# Patient Record
Sex: FEMALE | Race: BLACK OR AFRICAN AMERICAN | NOT HISPANIC OR LATINO | Employment: OTHER | URBAN - METROPOLITAN AREA
[De-identification: names, ages, dates, MRNs, and addresses within clinical notes are randomized per-mention and may not be internally consistent; named-entity substitution may affect disease eponyms.]

---

## 2017-10-16 PROBLEM — M19.011 GLENOHUMERAL ARTHRITIS, RIGHT: Status: ACTIVE | Noted: 2017-10-16

## 2017-11-30 PROBLEM — D64.9 ANEMIA: Status: ACTIVE | Noted: 2017-11-30

## 2017-11-30 PROBLEM — N18.30 CKD (CHRONIC KIDNEY DISEASE) STAGE 3, GFR 30-59 ML/MIN: Status: ACTIVE | Noted: 2017-11-30

## 2017-12-10 PROBLEM — E87.1 HYPONATREMIA: Status: ACTIVE | Noted: 2017-12-10

## 2017-12-10 PROBLEM — E16.2 HYPOGLYCEMIA: Status: ACTIVE | Noted: 2017-12-10

## 2017-12-11 PROBLEM — E87.5 HYPERKALEMIA: Status: ACTIVE | Noted: 2017-12-11

## 2018-01-16 PROBLEM — Z96.611 STATUS POST REVERSE TOTAL REPLACEMENT OF RIGHT SHOULDER: Status: ACTIVE | Noted: 2018-01-16

## 2018-06-05 PROBLEM — M47.816 ARTHRITIS OF LUMBAR SPINE: Status: ACTIVE | Noted: 2018-06-05

## 2018-07-29 PROBLEM — R00.1 BRADYCARDIA: Status: ACTIVE | Noted: 2018-07-29

## 2018-07-29 PROBLEM — N17.9 AKI (ACUTE KIDNEY INJURY): Status: ACTIVE | Noted: 2018-07-29

## 2018-07-30 ENCOUNTER — TELEPHONE (OUTPATIENT)
Dept: NEPHROLOGY | Facility: CLINIC | Age: 83
End: 2018-07-30

## 2018-07-30 NOTE — TELEPHONE ENCOUNTER
As LAURA was calling consult, but patient reported after consult was placed  that she has a nephrologist.  I asked the staff member to remove the patient from the hospital call list.

## 2018-07-31 PROBLEM — E87.1 HYPONATREMIA: Status: RESOLVED | Noted: 2017-12-10 | Resolved: 2018-07-31

## 2018-07-31 PROBLEM — R19.7 DIARRHEA: Status: ACTIVE | Noted: 2018-07-31

## 2020-05-04 PROBLEM — N18.4 CKD (CHRONIC KIDNEY DISEASE) STAGE 4, GFR 15-29 ML/MIN: Status: ACTIVE | Noted: 2020-05-04

## 2020-06-08 PROBLEM — M46.96 UNSPECIFIED INFLAMMATORY SPONDYLOPATHY, LUMBAR REGION: Status: ACTIVE | Noted: 2020-06-08

## 2020-10-04 PROBLEM — R11.2 NAUSEA WITH VOMITING: Status: ACTIVE | Noted: 2020-10-04

## 2020-10-04 PROBLEM — E87.1 HYPONATREMIA: Status: ACTIVE | Noted: 2020-10-04

## 2020-10-04 PROBLEM — Z71.89 ADVANCE CARE PLANNING: Status: ACTIVE | Noted: 2020-10-04

## 2020-10-07 PROBLEM — Z75.8 DISCHARGE PLANNING ISSUES: Status: ACTIVE | Noted: 2020-10-07

## 2020-10-16 PROBLEM — R00.1 SYMPTOMATIC BRADYCARDIA: Status: ACTIVE | Noted: 2020-10-16

## 2020-10-18 PROBLEM — E04.2 MULTIPLE THYROID NODULES: Status: ACTIVE | Noted: 2020-10-18

## 2020-10-22 PROBLEM — E04.1 THYROID NODULE: Status: ACTIVE | Noted: 2020-10-22

## 2021-05-08 PROBLEM — R00.1 JUNCTIONAL BRADYCARDIA: Status: ACTIVE | Noted: 2021-05-08

## 2021-05-08 PROBLEM — J81.0 ACUTE PULMONARY EDEMA: Status: ACTIVE | Noted: 2021-05-08

## 2021-05-08 PROBLEM — N18.4 ACUTE RENAL FAILURE SUPERIMPOSED ON STAGE 4 CHRONIC KIDNEY DISEASE: Status: ACTIVE | Noted: 2018-07-29

## 2021-05-08 PROBLEM — I50.33 ACUTE ON CHRONIC DIASTOLIC HEART FAILURE: Status: ACTIVE | Noted: 2021-05-08

## 2021-05-08 PROBLEM — R00.1 SYMPTOMATIC BRADYCARDIA: Status: RESOLVED | Noted: 2020-10-16 | Resolved: 2021-05-08

## 2021-05-09 PROBLEM — D63.1 ANEMIA DUE TO STAGE 4 CHRONIC KIDNEY DISEASE: Status: ACTIVE | Noted: 2017-11-30

## 2021-05-09 PROBLEM — N18.4 ANEMIA DUE TO STAGE 4 CHRONIC KIDNEY DISEASE: Status: ACTIVE | Noted: 2017-11-30

## 2021-05-10 PROBLEM — R53.81 PHYSICAL DEBILITY: Status: ACTIVE | Noted: 2021-05-10

## 2021-05-25 PROBLEM — Z95.0 STATUS POST PLACEMENT OF CARDIAC PACEMAKER: Status: ACTIVE | Noted: 2021-05-25

## 2021-08-09 PROBLEM — J81.0 ACUTE PULMONARY EDEMA: Status: RESOLVED | Noted: 2021-05-08 | Resolved: 2021-08-09

## 2022-03-18 ENCOUNTER — IMMUNIZATION (OUTPATIENT)
Dept: PHARMACY | Facility: CLINIC | Age: 87
End: 2022-03-18
Payer: MEDICAID

## 2022-03-18 DIAGNOSIS — Z23 NEED FOR VACCINATION: Primary | ICD-10-CM

## 2022-09-01 PROBLEM — F03.90 DEMENTIA WITHOUT BEHAVIORAL DISTURBANCE, UNSPECIFIED DEMENTIA TYPE: Status: ACTIVE | Noted: 2022-09-01

## 2022-09-01 PROBLEM — E21.3 HYPERPARATHYROIDISM: Status: ACTIVE | Noted: 2022-09-01

## 2022-12-26 PROBLEM — K57.92 DIVERTICULITIS: Status: ACTIVE | Noted: 2022-12-26

## 2022-12-29 PROBLEM — U07.1 COVID: Status: ACTIVE | Noted: 2022-12-29

## 2022-12-29 PROBLEM — I50.43 ACUTE ON CHRONIC COMBINED SYSTOLIC AND DIASTOLIC HEART FAILURE: Status: ACTIVE | Noted: 2021-05-08

## 2022-12-30 PROBLEM — R53.1 WEAKNESS: Status: RESOLVED | Noted: 2021-05-10 | Resolved: 2022-12-30

## 2022-12-30 PROBLEM — R53.1 WEAKNESS: Status: ACTIVE | Noted: 2021-05-10

## 2022-12-30 PROBLEM — I50.32 CHRONIC DIASTOLIC HEART FAILURE: Status: ACTIVE | Noted: 2021-05-08

## 2023-01-13 PROBLEM — I50.9 ACUTE EXACERBATION OF CHF (CONGESTIVE HEART FAILURE): Status: ACTIVE | Noted: 2023-01-13

## 2023-01-14 PROBLEM — I48.19 PERSISTENT ATRIAL FIBRILLATION: Status: ACTIVE | Noted: 2023-01-14

## 2023-01-16 PROBLEM — M75.52 ACUTE BURSITIS OF LEFT SHOULDER: Status: ACTIVE | Noted: 2023-01-16

## 2023-01-16 PROBLEM — R53.81 PHYSICAL DECONDITIONING: Status: ACTIVE | Noted: 2023-01-16

## 2023-03-09 PROBLEM — Z99.89 WALKER AS AMBULATION AID: Status: ACTIVE | Noted: 2023-03-09

## 2023-03-09 PROBLEM — E78.5 DYSLIPIDEMIA ASSOCIATED WITH TYPE 2 DIABETES MELLITUS: Status: ACTIVE | Noted: 2023-03-09

## 2023-03-09 PROBLEM — R54 FRAIL ELDERLY: Status: ACTIVE | Noted: 2023-03-09

## 2023-03-09 PROBLEM — E11.69 DYSLIPIDEMIA ASSOCIATED WITH TYPE 2 DIABETES MELLITUS: Status: ACTIVE | Noted: 2023-03-09

## 2023-04-03 PROBLEM — N18.4 ACUTE RENAL FAILURE SUPERIMPOSED ON STAGE 4 CHRONIC KIDNEY DISEASE: Status: RESOLVED | Noted: 2018-07-29 | Resolved: 2023-04-03

## 2023-04-03 PROBLEM — N17.9 ACUTE RENAL FAILURE SUPERIMPOSED ON STAGE 4 CHRONIC KIDNEY DISEASE: Status: RESOLVED | Noted: 2018-07-29 | Resolved: 2023-04-03

## 2023-05-20 PROBLEM — E11.69 DYSLIPIDEMIA ASSOCIATED WITH TYPE 2 DIABETES MELLITUS: Status: RESOLVED | Noted: 2023-03-09 | Resolved: 2023-05-20

## 2023-05-20 PROBLEM — E78.5 DYSLIPIDEMIA ASSOCIATED WITH TYPE 2 DIABETES MELLITUS: Status: RESOLVED | Noted: 2023-03-09 | Resolved: 2023-05-20

## 2023-05-30 PROBLEM — N18.32 STAGE 3B CHRONIC KIDNEY DISEASE: Status: ACTIVE | Noted: 2020-05-04

## 2023-05-30 PROBLEM — E87.1 HYPONATREMIA: Status: RESOLVED | Noted: 2020-10-04 | Resolved: 2023-05-30

## 2023-05-30 PROBLEM — E87.5 HYPERKALEMIA: Status: RESOLVED | Noted: 2017-12-11 | Resolved: 2023-05-30

## 2023-05-30 PROBLEM — R00.1 BRADYCARDIA: Status: RESOLVED | Noted: 2018-07-29 | Resolved: 2023-05-30

## 2023-05-30 PROBLEM — D63.1 ANEMIA DUE TO STAGE 4 CHRONIC KIDNEY DISEASE: Status: RESOLVED | Noted: 2017-11-30 | Resolved: 2023-05-30

## 2023-05-30 PROBLEM — N18.4 ANEMIA DUE TO STAGE 4 CHRONIC KIDNEY DISEASE: Status: RESOLVED | Noted: 2017-11-30 | Resolved: 2023-05-30

## 2023-06-13 ENCOUNTER — OUTPATIENT CASE MANAGEMENT (OUTPATIENT)
Dept: ADMINISTRATIVE | Facility: OTHER | Age: 88
End: 2023-06-13
Payer: MEDICARE

## 2023-06-13 NOTE — PROGRESS NOTES
"Outpatient Care Management  Initial Patient Assessment    Patient: Kezia Gentile  MRN: [id number]  Date of Service: 06/13/2023  Completed by: Destiny Avalos RN  Referral Date: 05/21/2023  Program: High Risk  Status: Ongoing  Effective Dates: 6/13/2023 - present  Responsible Staff: Destiny Avalos RN    Reason for Visit   Patient presents with    OPC Enrollment Call     6/13/23    Nursing Assessment     6/13/23     Brief Summary:  Kezia Gentile was referred by  at [facility] for CHF. Patient qualifies for program based on high risk score of 73.1%. Active problem list, medical, surgical and social history reviewed. Active comorbidities include PPM, CKD4, HTN, HLD, A FIB, DM2, depression and neuropathy. Areas of need identified by patient include CHF.     During the call with , she expressed concerns with being able to afford her utility bills.     Next steps:   Refer to [facility] SW for financial assistance resources with utility bills.  Send in basket message to Dr. Trujillo informing him that  has been enrolled in the OPCM program.  Send in basket message to [facility] to place their services on hold during OPC enrollment.   Send Mrs. Gentile educational materials "Home Care Guide for Heart Failure Patients" and "Heart Healthy Eating Nutrition Therapy" via [employer] for review.     Disability Status  Hearing Difficulty or Deaf: yes  Hearing Management: other (speak loudly)  Visual Difficulty or Blind: yes  Vision Management: Other (glasses)  Difficulty Concentrating, Remembering or Making Decisions: no  Communication Difficulty: no  Eating/Swallowing Difficulty: no  Walking or Climbing Stairs Difficulty: yes  Dressing/Bathing Difficulty: yes  Toileting : Independent  Difficulty Managing Errands Independently: yes  Equipment Currently Used at Home: walker, rolling; bath bench; shower chair; grab bar  Change in Functional Status Since Onset of Current Illness/Injury: no    Spiritual " "Beliefs  Spiritual, Cultural Beliefs, Oriental orthodox Practices, Values that Affect Care: no    Social History     Socioeconomic History    Marital status:    Tobacco Use    Smoking status: Never    Smokeless tobacco: Never   Substance and Sexual Activity    Alcohol use: Yes     Comment: "nightcap before bed"    Drug use: Never    Sexual activity: Not Currently     Social Determinants of Health     Financial Resource Strain: High Risk    Difficulty of Paying Living Expenses: Very hard   Food Insecurity: Food Insecurity Present    Worried About Running Out of Food in the Last Year: Sometimes true    Ran Out of Food in the Last Year: Sometimes true   Transportation Needs: No Transportation Needs    Lack of Transportation (Medical): No    Lack of Transportation (Non-Medical): No   Physical Activity: Insufficiently Active    Days of Exercise per Week: 1 day    Minutes of Exercise per Session: 20 min   Stress: Stress Concern Present    Feeling of Stress : To some extent   Social Connections: Moderately Isolated    Frequency of Communication with Friends and Family: More than three times a week    Frequency of Social Gatherings with Friends and Family: More than three times a week    Attends Oriental orthodox Services: 1 to 4 times per year    Active Member of Clubs or Organizations: No    Attends Club or Organization Meetings: Never    Marital Status:    Housing Stability: Low Risk     Unable to Pay for Housing in the Last Year: No    Number of Places Lived in the Last Year: 1    Unstable Housing in the Last Year: No     Roles and Relationships  Primary Source of Support/Comfort: nonrelative caregiver; child(thomas)  Name of Support/Comfort Primary Source: Joya Mckeon Caregiver  Primary Roles/Responsibilities: retired    Advance Directives (For Healthcare)  Advance Directive  (If Adv Dir status is received, view document under Adv Dir in header or Chart Review Media tab): Advance Directive currently in Epic.    Patient " Reported Insurance  Verified current insurance plan:: [employer]vantage; Medicaid  [employer] benefits discussed:: OTC Prescription Discounts; Well [employer]; Mail Order Pharmacy    Depression Patient Health Questionnaire 6/13/2023 3/9/2023 2/9/2022 2/5/2019 1/23/2018 1/3/2017 9/6/2016   Over the last two weeks how often have you been bothered by little interest or pleasure in doing things Not at all Not at all Several days Not at all Not at all Not at all Not at all   Over the last two weeks how often have you been bothered by feeling down, depressed or hopeless Not at all Not at all Several days Not at all Not at all Not at all Not at all   PHQ-2 Total Score 0 0 2 0 0 0 0   Over the last two weeks how often have you been bothered by trouble falling or staying asleep, or sleeping too much - - Nearly every day - - - -   Over the last two weeks how often have you been bothered by feeling tired or having little energy - - More than half the days - - - -   Over the last two weeks how often have you been bothered by a poor appetite or overeating - - Several days - - - -   Over the last two weeks how often have you been bothered by feeling bad about yourself - or that you are a failure or have let yourself or your family down - - Several days - - - -   Over the last two weeks how often have you been bothered by trouble concentrating on things, such as reading the newspaper or watching television - - More than half the days - - - -   Over the last two weeks how often have you been bothered by moving or speaking so slowly that other people could have noticed. Or the opposite - being so fidgety or restless that you have been moving around a lot more than usual. - - Several days - - - -   Over the last two weeks how often have you been bothered by thoughts that you would be better off dead, or of hurting yourself - - Not at all - - - -   If you checked off any problems, how difficult have these problems made it for you to do  your work, take care of things at home or get along with other people? - - Somewhat difficult - - - -   Total Score - - 12 - - - -   Interpretation - - Moderate - - - -     Learning Assessment       06/13/2023 1615 [facility] (6/13/2023 - Present)   Created by ELIOT Lassiter (Nurse) Status: Complete                 PRIMARY LEARNER     Primary Learner Name:  Kezia Gentile CD - 06/13/2023 1615    Relationship:  Patient CD - 06/13/2023 1615    Does the primary learner have any barriers to learning?:  Visual CD - 06/13/2023 1615    What is the preferred language of the primary learner?:  English CD - 06/13/2023 1615    Is an  required?:  No CD - 06/13/2023 1615    How does the primary learner prefer to learn new concepts?:  Reading, Listening, Demonstration, Pictures/Video CD - 06/13/2023 1615    How often do you need to have someone help you read instructions, pamphlets, or written material from your doctor or pharmacy?:  Sometimes CD - 06/13/2023 1615        CO-LEARNER #1     Co-Learner Name (if applicable):  Joya Mckeon CD - 06/13/2023 1615    Relationship:  Co-learner CD - 06/13/2023 1615    Does the co-learner have any barriers to learning?:  No Barriers CD - 06/13/2023 1615    What is the preferred language of the co-learner?:  English CD - 06/13/2023 1615    Is an  required?:  No  - 06/13/2023 1615    How does the co-learner prefer to learn new concepts?:  Listening, Reading, Demonstration, Pictures/Video CD - 06/13/2023 1615        CO-LEARNER #2     No question answered        SPECIAL TOPICS     No question answered        ANSWERED BY:     No question answered        Edit History       ELIOT Lassiter (Nurse)   06/13/2023 1615

## 2023-06-13 NOTE — LETTER
Kezia Gentile  [id number] [address] [facility] [address]    Dear ,     Welcome to [facility]s Outpatient Care Management Program. We are here to assist patients with multiple long-term (chronic) conditions who often need more personalized healthcare.    It was a pleasure talking with you today. My name is Destiny Avalos RN. The [facility] Outpatient Case Management team  looks forward to working with you as your care management provider. A  will be contacting you by telephone routinely to help coordinate care and resolve issues.    Our goal is to help you function at the healthiest and highest level possible. You can contact the department directly at [phone].    As an [facility] patient with [employer], some of the services we provide, at no cost to you, include:      Development of an individualized care plan with a Registered Nurse    Connection with a    Assistance from a Community Health Worker   Connection with available resources and services     Coordinate communication among your care team members    Provide coaching and education    Help you understand your doctors treatment plan   Help you obtain information about your insurance coverage.     All services provided by [facility]s Outpatient Care Managers and other care team members are coordinated with and communicated to your primary care team.      As part of your enrollment, you will be receiving education materials and more information about these services in your [facility] account, by phone, or through the mail. If you do not wish to participate or receive information, you can Opt Out by contacting our office at [phone].      Sincerely,        Destiny Avalos RN  [facility]   Outpatient Care Management

## 2023-06-13 NOTE — PATIENT INSTRUCTIONS
Home Care Guide for Heart Failure Patients and Heart Healthy Eating Nutrtion Therapy printed education materials mailed to patient for review.

## 2023-06-19 ENCOUNTER — OUTPATIENT CASE MANAGEMENT (OUTPATIENT)
Dept: ADMINISTRATIVE | Facility: OTHER | Age: 88
End: 2023-06-19
Payer: MEDICARE

## 2023-06-19 NOTE — PROGRESS NOTES
Outpatient Care Management   - High Risk Patient Assessment    Patient: Kezia Gentile  MRN:  [id number]  Date of Service:  6/19/2023  Completed by:  Rubia Moura LMSW  Referral Date: 05/21/2023    Reason for Visit   Patient presents with    Social Work Assessment - High Risk     6/19/2023       Brief Summary:  [facility] Destiny Avalos for the following patient identified psycho-social needs : utility assistance. SW completed social assessment with pt via telephone. Pt lives alone, but her son Marin and daughter in law live next door and help out often. Pt utilizes a walker, rollator and shower chair. Pt's requires assistance from daughter in law for cooking and bathing an organizing her pill box. Pt's daughter in law also brings her to MD appointments. Pt is currently getting food stamps. Pt stated she is having difficulty with her utility bills. She is trying to catch up on her power bill. SW and pt discussed [facility] on Aging and their services. Sw informed pt that the [facility] requires the patient to call to do the intake for MOW and utility assistance. Pt reported she used to get MOW in the past. Pt stated she will call. SW gave her the contact number. SW will search for other community resources and follow up in 1 week, pt agreeable.  Care plan was created in collaboration with patient/caregiver input.  completed the SDOH questionnaire.     Cephalexin Pregnancy And Lactation Text: This medication is Pregnancy Category B and considered safe during pregnancy.  It is also excreted in breast milk but can be used safely for shorter doses.

## 2023-06-27 ENCOUNTER — OUTPATIENT CASE MANAGEMENT (OUTPATIENT)
Dept: ADMINISTRATIVE | Facility: OTHER | Age: 88
End: 2023-06-27
Payer: MEDICARE

## 2023-06-27 NOTE — PROGRESS NOTES
Outpatient Care Management   - Care Plan Follow Up    Patient: Kezia Gentile  MRN:  [id number]  Date of Service:  6/27/2023  Completed by:  Rubia Moura LMSW  Referral Date: 05/21/2023    Reason for Visit   Patient presents with    OPCM SW Follow Up Call     6/27/2023       Brief Summary: SW followed up with pt via telephone. She stated she spoke with the [facility] on [facility] and they were able to help her with this month's utility bill. Pt did not ask them about Meals on Wheels. [facility] SW to follow up with pt in 2 weeks, pt agreeable.     Complex Care Plan     Care plan was discussed and completed today with input from patient and/or caregiver.    Patient Instructions     No follow-ups on file.

## 2023-07-11 ENCOUNTER — OUTPATIENT CASE MANAGEMENT (OUTPATIENT)
Dept: ADMINISTRATIVE | Facility: OTHER | Age: 88
End: 2023-07-11
Payer: MEDICARE

## 2023-07-11 NOTE — PROGRESS NOTES
Outpatient Care Management   - Care Plan Follow Up    Patient: Kezia Gentile  MRN:  [id number]  Date of Service:  7/11/2023  Completed by:  Rubia Moura LMSW  Referral Date: 05/21/2023    Reason for Visit   Patient presents with    Case Closure     7/11/2023       Brief Summary: SW followed up with pt via telephone. She stated she is doing well. Ptis now receiving Meals on Wheels from [employer]. Pt denied any other needs/concerns. SW and pt discussed case closure and pt was agreeable. Case closed, notified OPCM RN.    Complex Care Plan     Care plan was discussed and completed today with input from patient and/or caregiver.    Patient Instructions     No follow-ups on file.

## 2023-07-12 ENCOUNTER — OUTPATIENT CASE MANAGEMENT (OUTPATIENT)
Dept: ADMINISTRATIVE | Facility: OTHER | Age: 88
End: 2023-07-12
Payer: MEDICARE

## 2023-07-17 PROBLEM — Z91.81 AT HIGH RISK FOR INJURY RELATED TO FALL: Status: ACTIVE | Noted: 2023-07-17

## 2023-07-19 ENCOUNTER — OUTPATIENT CASE MANAGEMENT (OUTPATIENT)
Dept: ADMINISTRATIVE | Facility: OTHER | Age: 88
End: 2023-07-19
Payer: MEDICARE

## 2023-07-27 PROBLEM — S10.0XXA: Status: ACTIVE | Noted: 2023-07-27

## 2023-07-27 PROBLEM — Z91.89 AT HIGH RISK FOR BLEEDING: Status: ACTIVE | Noted: 2023-07-27

## 2023-08-02 ENCOUNTER — OUTPATIENT CASE MANAGEMENT (OUTPATIENT)
Dept: ADMINISTRATIVE | Facility: OTHER | Age: 88
End: 2023-08-02
Payer: MEDICARE

## 2023-08-11 ENCOUNTER — OUTPATIENT CASE MANAGEMENT (OUTPATIENT)
Dept: ADMINISTRATIVE | Facility: OTHER | Age: 88
End: 2023-08-11
Payer: MEDICARE

## 2023-08-15 PROBLEM — Z95.818 PRESENCE OF WATCHMAN LEFT ATRIAL APPENDAGE CLOSURE DEVICE: Status: ACTIVE | Noted: 2023-08-15

## 2023-08-18 ENCOUNTER — OUTPATIENT CASE MANAGEMENT (OUTPATIENT)
Dept: ADMINISTRATIVE | Facility: OTHER | Age: 88
End: 2023-08-18
Payer: MEDICARE

## 2023-08-18 NOTE — PROGRESS NOTES
Outpatient Care Management  Plan of Care Follow Up Visit    Patient: Kezia Gentile  MRN: [id number]  Date of Service: 08/18/2023  Completed by: Rosalinda Sr RN  Referral Date: 05/21/2023    Reason for Visit   Patient presents with    OPCM RN Follow Up Call    Case Closure       Brief Summary: Phone contact with caregiver, Joya, today for follow up and d/c from OPCM. Instructed of need to assist Mrs Mast to follow up with the appropriate provider for any symptoms causing concern in the future and Joya voiced understanding. She also voiced agreement with plan to d/c her from OPCM.   Next Steps: D/C from OPCM today. Rosalinda Sr RN

## 2023-09-14 PROBLEM — N18.5 CKD (CHRONIC KIDNEY DISEASE) STAGE 5, GFR LESS THAN 15 ML/MIN: Status: ACTIVE | Noted: 2023-09-14

## 2023-10-10 PROBLEM — R13.10 DYSPHAGIA: Status: ACTIVE | Noted: 2023-10-10

## 2023-10-13 PROBLEM — E66.09 CLASS 1 OBESITY DUE TO EXCESS CALORIES WITH SERIOUS COMORBIDITY AND BODY MASS INDEX (BMI) OF 32.0 TO 32.9 IN ADULT: Status: ACTIVE | Noted: 2023-10-13

## 2024-02-06 PROBLEM — N18.4 CHRONIC KIDNEY DISEASE, STAGE 4 (SEVERE): Status: ACTIVE | Noted: 2024-02-06

## 2024-02-06 PROBLEM — N18.5 CKD (CHRONIC KIDNEY DISEASE) STAGE 5, GFR LESS THAN 15 ML/MIN: Status: RESOLVED | Noted: 2023-09-14 | Resolved: 2024-02-06

## 2024-02-06 PROBLEM — I50.33 ACUTE ON CHRONIC HEART FAILURE WITH PRESERVED EJECTION FRACTION (HFPEF): Status: ACTIVE | Noted: 2024-02-06

## 2024-02-06 PROBLEM — E66.01 SEVERE OBESITY (BMI 35.0-39.9) WITH COMORBIDITY: Status: ACTIVE | Noted: 2024-02-06

## 2024-02-06 PROBLEM — F33.1 MAJOR DEPRESSIVE DISORDER, RECURRENT EPISODE, MODERATE DEGREE: Status: ACTIVE | Noted: 2024-02-06

## 2024-02-18 PROBLEM — D64.9 NORMOCYTIC ANEMIA: Status: ACTIVE | Noted: 2024-02-18
